# Patient Record
Sex: FEMALE | Race: WHITE | NOT HISPANIC OR LATINO | URBAN - NONMETROPOLITAN AREA
[De-identification: names, ages, dates, MRNs, and addresses within clinical notes are randomized per-mention and may not be internally consistent; named-entity substitution may affect disease eponyms.]

---

## 2017-12-19 NOTE — PATIENT DISCUSSION
If chalazion worsens, patient advised to return to clinic. discussed possible need for steroid injection and/or I&D on follow up if no improvement with conservative measures.

## 2021-03-04 ENCOUNTER — IMPORTED ENCOUNTER (OUTPATIENT)
Dept: URBAN - NONMETROPOLITAN AREA CLINIC 1 | Facility: CLINIC | Age: 31
End: 2021-03-04

## 2021-03-04 PROBLEM — H16.8: Noted: 2021-03-04

## 2021-03-04 PROCEDURE — 99203 OFFICE O/P NEW LOW 30 MIN: CPT

## 2021-03-04 NOTE — PATIENT DISCUSSION
Keratitis OS-  discussed findings w/patient-  mild irritation noted-  swept under fornices-  no CL found -  start Maxitrol QID OS x 5 days-  continue erythromycin maría as scheduled-  continue to monitor as per Dr. Dominic Crenshaw

## 2022-04-09 ASSESSMENT — VISUAL ACUITY
OS_CC: 20/30-
OD_PH: 20/50
OD_CC: 20/400

## 2022-06-21 NOTE — PATIENT DISCUSSION
Encouraged to identify and avoid triggers. Possibly due to Effexor. Will discuss with her Medical care to try other meds. Pt reports she is also seeing her PCP in July and will discuss. Hx of low cholesterol levels and low BP.

## 2023-01-25 NOTE — PATIENT DISCUSSION
IOP stable but has had 24/21 high. 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 Continue to monitor.